# Patient Record
Sex: FEMALE | Race: OTHER | URBAN - METROPOLITAN AREA
[De-identification: names, ages, dates, MRNs, and addresses within clinical notes are randomized per-mention and may not be internally consistent; named-entity substitution may affect disease eponyms.]

---

## 2021-08-28 ENCOUNTER — TELEPHONE ENCOUNTER (OUTPATIENT)
Dept: URBAN - METROPOLITAN AREA CLINIC 13 | Facility: CLINIC | Age: 66
End: 2021-08-28

## 2021-08-29 ENCOUNTER — TELEPHONE ENCOUNTER (OUTPATIENT)
Dept: URBAN - METROPOLITAN AREA CLINIC 13 | Facility: CLINIC | Age: 66
End: 2021-08-29

## 2024-08-23 ENCOUNTER — DASHBOARD ENCOUNTERS (OUTPATIENT)
Age: 69
End: 2024-08-23

## 2024-08-29 ENCOUNTER — OFFICE VISIT (OUTPATIENT)
Dept: URBAN - METROPOLITAN AREA CLINIC 46 | Facility: CLINIC | Age: 69
End: 2024-08-29

## 2024-08-29 RX ORDER — HYDROXYZINE PAMOATE 25 MG/1
1 CAPSULE AT BEDTIME AS NEEDED CAPSULE ORAL ONCE A DAY
Qty: 90 CAPSULE | Status: ACTIVE | COMMUNITY

## 2024-08-29 RX ORDER — DOXYCYCLINE HYCLATE 100 MG/1
1 TABLET TABLET, FILM COATED ORAL ONCE A DAY
Qty: 7 TABLET | Status: ACTIVE | COMMUNITY

## 2024-08-29 RX ORDER — ALBUTEROL SULFATE 2.5 MG/3ML
3 ML AS NEEDED SOLUTION RESPIRATORY (INHALATION)
Qty: 360 ML | Status: ACTIVE | COMMUNITY

## 2024-08-29 RX ORDER — PREDNISONE 20 MG/1
AS DIRECTED TABLET ORAL
Status: ACTIVE | COMMUNITY

## 2024-08-29 RX ORDER — ALENDRONATE SODIUM 70 MG/1
1 TABLET 30 MINUTES BEFORE THE FIRST FOOD, BEVERAGE OR MEDICINE OF THE DAY WITH PLAIN WATER TABLET ORAL ONCE A DAY
Status: ACTIVE | COMMUNITY

## 2024-08-29 RX ORDER — ATORVASTATIN CALCIUM 80 MG/1
1 TABLET TABLET, FILM COATED ORAL ONCE A DAY
Qty: 90 TABLET | Status: ACTIVE | COMMUNITY

## 2024-08-29 RX ORDER — METOPROLOL TARTRATE 25 MG/1
1 TABLET WITH FOOD TABLET, FILM COATED ORAL TWICE A DAY
Qty: 180 TABLET | Refills: 3 | Status: ACTIVE | COMMUNITY

## 2024-08-29 NOTE — PHYSICAL EXAM CHEST:
no lesions,  no deformities,  chest wall non-tender, breathing is unlabored without accessory muscle use, lungs clear to auscultation bilaterally
stretcher

## 2024-08-29 NOTE — HPI-TODAY'S VISIT:
68-year-old female presents for screening colonoscopy.  Per PCP note 6/27/2024, patient had positive Cologuard in 2022. Labs 2//24 show elevation of AST to 43 (previously elevated to 52 in 2022, but normalized until recently).  LFTs otherwise normal.  CBC within normal limits. CT abdomen pelvis 2//24 for abdominal pain with moderate size hiatal hernia, no evidence of acute process.  EGD in 2015 showed distal food bolus obstruction, which was removed.  Esophageal stricture status post dilation, hiatal hernia.